# Patient Record
Sex: FEMALE | Race: OTHER | NOT HISPANIC OR LATINO | URBAN - METROPOLITAN AREA
[De-identification: names, ages, dates, MRNs, and addresses within clinical notes are randomized per-mention and may not be internally consistent; named-entity substitution may affect disease eponyms.]

---

## 2024-05-12 ENCOUNTER — INPATIENT (INPATIENT)
Facility: HOSPITAL | Age: 34
LOS: 1 days | Discharge: HOME | DRG: 951 | End: 2024-05-14
Attending: OBSTETRICS & GYNECOLOGY | Admitting: OBSTETRICS & GYNECOLOGY
Payer: MEDICAID

## 2024-05-12 VITALS
RESPIRATION RATE: 16 BRPM | WEIGHT: 149.91 LBS | SYSTOLIC BLOOD PRESSURE: 136 MMHG | TEMPERATURE: 99 F | DIASTOLIC BLOOD PRESSURE: 65 MMHG | OXYGEN SATURATION: 100 % | HEART RATE: 86 BPM | HEIGHT: 66 IN

## 2024-05-12 DIAGNOSIS — O26.899 OTHER SPECIFIED PREGNANCY RELATED CONDITIONS, UNSPECIFIED TRIMESTER: ICD-10-CM

## 2024-05-12 DIAGNOSIS — Z34.80 ENCOUNTER FOR SUPERVISION OF OTHER NORMAL PREGNANCY, UNSPECIFIED TRIMESTER: ICD-10-CM

## 2024-05-12 LAB
APTT BLD: 29.2 SEC — SIGNIFICANT CHANGE UP (ref 24.5–35.6)
BASOPHILS # BLD AUTO: 0.02 K/UL — SIGNIFICANT CHANGE UP (ref 0–0.2)
BASOPHILS NFR BLD AUTO: 0.1 % — SIGNIFICANT CHANGE UP (ref 0–2)
BLD GP AB SCN SERPL QL: SIGNIFICANT CHANGE UP
EOSINOPHIL # BLD AUTO: 0.02 K/UL — SIGNIFICANT CHANGE UP (ref 0–0.5)
EOSINOPHIL NFR BLD AUTO: 0.1 % — SIGNIFICANT CHANGE UP (ref 0–6)
FIBRINOGEN PPP-MCNC: 381 MG/DL — SIGNIFICANT CHANGE UP (ref 200–475)
HCT VFR BLD CALC: 37.9 % — SIGNIFICANT CHANGE UP (ref 34.5–45)
HGB BLD-MCNC: 12.2 G/DL — SIGNIFICANT CHANGE UP (ref 11.5–15.5)
IMM GRANULOCYTES NFR BLD AUTO: 0.3 % — SIGNIFICANT CHANGE UP (ref 0–0.9)
INR BLD: 0.88 RATIO — SIGNIFICANT CHANGE UP (ref 0.85–1.18)
LYMPHOCYTES # BLD AUTO: 1.08 K/UL — SIGNIFICANT CHANGE UP (ref 1–3.3)
LYMPHOCYTES # BLD AUTO: 7.6 % — LOW (ref 13–44)
MCHC RBC-ENTMCNC: 27.5 PG — SIGNIFICANT CHANGE UP (ref 27–34)
MCHC RBC-ENTMCNC: 32.2 GM/DL — SIGNIFICANT CHANGE UP (ref 32–36)
MCV RBC AUTO: 85.4 FL — SIGNIFICANT CHANGE UP (ref 80–100)
MONOCYTES # BLD AUTO: 0.68 K/UL — SIGNIFICANT CHANGE UP (ref 0–0.9)
MONOCYTES NFR BLD AUTO: 4.8 % — SIGNIFICANT CHANGE UP (ref 2–14)
NEUTROPHILS # BLD AUTO: 12.29 K/UL — HIGH (ref 1.8–7.4)
NEUTROPHILS NFR BLD AUTO: 87.1 % — HIGH (ref 43–77)
NRBC # BLD: 0 /100 WBCS — SIGNIFICANT CHANGE UP (ref 0–0)
PLATELET # BLD AUTO: 263 K/UL — SIGNIFICANT CHANGE UP (ref 150–400)
PROTHROM AB SERPL-ACNC: 10.1 SEC — SIGNIFICANT CHANGE UP (ref 9.5–13)
RBC # BLD: 4.44 M/UL — SIGNIFICANT CHANGE UP (ref 3.8–5.2)
RBC # FLD: 14.4 % — SIGNIFICANT CHANGE UP (ref 10.3–14.5)
WBC # BLD: 14.13 K/UL — HIGH (ref 3.8–10.5)
WBC # FLD AUTO: 14.13 K/UL — HIGH (ref 3.8–10.5)

## 2024-05-12 PROCEDURE — 88307 TISSUE EXAM BY PATHOLOGIST: CPT | Mod: 26

## 2024-05-12 RX ORDER — FAMOTIDINE 10 MG/ML
20 INJECTION INTRAVENOUS DAILY
Refills: 0 | Status: DISCONTINUED | OUTPATIENT
Start: 2024-05-12 | End: 2024-05-12

## 2024-05-12 RX ORDER — AMPICILLIN TRIHYDRATE 250 MG
1 CAPSULE ORAL EVERY 4 HOURS
Refills: 0 | Status: DISCONTINUED | OUTPATIENT
Start: 2024-05-12 | End: 2024-05-12

## 2024-05-12 RX ORDER — ACETAMINOPHEN 500 MG
975 TABLET ORAL EVERY 6 HOURS
Refills: 0 | Status: DISCONTINUED | OUTPATIENT
Start: 2024-05-12 | End: 2024-05-14

## 2024-05-12 RX ORDER — SODIUM CHLORIDE 9 MG/ML
3 INJECTION INTRAMUSCULAR; INTRAVENOUS; SUBCUTANEOUS EVERY 8 HOURS
Refills: 0 | Status: DISCONTINUED | OUTPATIENT
Start: 2024-05-12 | End: 2024-05-14

## 2024-05-12 RX ORDER — KETOROLAC TROMETHAMINE 30 MG/ML
30 SYRINGE (ML) INJECTION ONCE
Refills: 0 | Status: DISCONTINUED | OUTPATIENT
Start: 2024-05-12 | End: 2024-05-12

## 2024-05-12 RX ORDER — CITRIC ACID/SODIUM CITRATE 300-500 MG
15 SOLUTION, ORAL ORAL EVERY 6 HOURS
Refills: 0 | Status: DISCONTINUED | OUTPATIENT
Start: 2024-05-12 | End: 2024-05-12

## 2024-05-12 RX ORDER — OXYTOCIN 10 UNIT/ML
333.33 VIAL (ML) INJECTION
Qty: 20 | Refills: 0 | Status: DISCONTINUED | OUTPATIENT
Start: 2024-05-12 | End: 2024-05-14

## 2024-05-12 RX ORDER — PRAMOXINE HYDROCHLORIDE 150 MG/15G
1 AEROSOL, FOAM RECTAL EVERY 4 HOURS
Refills: 0 | Status: DISCONTINUED | OUTPATIENT
Start: 2024-05-12 | End: 2024-05-14

## 2024-05-12 RX ORDER — IBUPROFEN 200 MG
600 TABLET ORAL EVERY 6 HOURS
Refills: 0 | Status: COMPLETED | OUTPATIENT
Start: 2024-05-12 | End: 2025-04-10

## 2024-05-12 RX ORDER — CHLORHEXIDINE GLUCONATE 213 G/1000ML
1 SOLUTION TOPICAL DAILY
Refills: 0 | Status: DISCONTINUED | OUTPATIENT
Start: 2024-05-12 | End: 2024-05-12

## 2024-05-12 RX ORDER — AER TRAVELER 0.5 G/1
1 SOLUTION RECTAL; TOPICAL EVERY 4 HOURS
Refills: 0 | Status: DISCONTINUED | OUTPATIENT
Start: 2024-05-12 | End: 2024-05-14

## 2024-05-12 RX ORDER — DIPHENHYDRAMINE HCL 50 MG
25 CAPSULE ORAL EVERY 6 HOURS
Refills: 0 | Status: DISCONTINUED | OUTPATIENT
Start: 2024-05-12 | End: 2024-05-14

## 2024-05-12 RX ORDER — SODIUM CHLORIDE 9 MG/ML
1000 INJECTION, SOLUTION INTRAVENOUS
Refills: 0 | Status: DISCONTINUED | OUTPATIENT
Start: 2024-05-12 | End: 2024-05-12

## 2024-05-12 RX ORDER — DIBUCAINE 1 %
1 OINTMENT (GRAM) RECTAL EVERY 6 HOURS
Refills: 0 | Status: DISCONTINUED | OUTPATIENT
Start: 2024-05-12 | End: 2024-05-14

## 2024-05-12 RX ORDER — MAGNESIUM HYDROXIDE 400 MG/1
30 TABLET, CHEWABLE ORAL
Refills: 0 | Status: DISCONTINUED | OUTPATIENT
Start: 2024-05-12 | End: 2024-05-14

## 2024-05-12 RX ORDER — BENZOCAINE 10 %
1 GEL (GRAM) MUCOUS MEMBRANE EVERY 6 HOURS
Refills: 0 | Status: DISCONTINUED | OUTPATIENT
Start: 2024-05-12 | End: 2024-05-14

## 2024-05-12 RX ORDER — SIMETHICONE 80 MG/1
80 TABLET, CHEWABLE ORAL EVERY 4 HOURS
Refills: 0 | Status: DISCONTINUED | OUTPATIENT
Start: 2024-05-12 | End: 2024-05-14

## 2024-05-12 RX ORDER — TETANUS TOXOID, REDUCED DIPHTHERIA TOXOID AND ACELLULAR PERTUSSIS VACCINE, ADSORBED 5; 2.5; 8; 8; 2.5 [IU]/.5ML; [IU]/.5ML; UG/.5ML; UG/.5ML; UG/.5ML
0.5 SUSPENSION INTRAMUSCULAR ONCE
Refills: 0 | Status: DISCONTINUED | OUTPATIENT
Start: 2024-05-12 | End: 2024-05-14

## 2024-05-12 RX ORDER — AMPICILLIN TRIHYDRATE 250 MG
2 CAPSULE ORAL ONCE
Refills: 0 | Status: COMPLETED | OUTPATIENT
Start: 2024-05-12 | End: 2024-05-12

## 2024-05-12 RX ORDER — OXYCODONE HYDROCHLORIDE 5 MG/1
5 TABLET ORAL ONCE
Refills: 0 | Status: DISCONTINUED | OUTPATIENT
Start: 2024-05-12 | End: 2024-05-14

## 2024-05-12 RX ORDER — LANOLIN
1 OINTMENT (GRAM) TOPICAL EVERY 6 HOURS
Refills: 0 | Status: DISCONTINUED | OUTPATIENT
Start: 2024-05-12 | End: 2024-05-14

## 2024-05-12 RX ORDER — HYDROCORTISONE 1 %
1 OINTMENT (GRAM) TOPICAL EVERY 6 HOURS
Refills: 0 | Status: DISCONTINUED | OUTPATIENT
Start: 2024-05-12 | End: 2024-05-14

## 2024-05-12 RX ADMIN — SODIUM CHLORIDE 125 MILLILITER(S): 9 INJECTION, SOLUTION INTRAVENOUS at 13:29

## 2024-05-12 RX ADMIN — Medication 1 APPLICATION(S): at 15:52

## 2024-05-12 RX ADMIN — Medication 1 TABLET(S): at 14:42

## 2024-05-12 RX ADMIN — Medication 975 MILLIGRAM(S): at 13:30

## 2024-05-12 RX ADMIN — Medication 975 MILLIGRAM(S): at 14:43

## 2024-05-12 RX ADMIN — SODIUM CHLORIDE 3 MILLILITER(S): 9 INJECTION INTRAMUSCULAR; INTRAVENOUS; SUBCUTANEOUS at 13:30

## 2024-05-12 RX ADMIN — Medication 1000 MILLIUNIT(S)/MIN: at 13:23

## 2024-05-12 RX ADMIN — Medication 30 MILLIGRAM(S): at 13:30

## 2024-05-12 RX ADMIN — Medication 30 MILLIGRAM(S): at 12:32

## 2024-05-12 RX ADMIN — Medication 1 SPRAY(S): at 14:42

## 2024-05-12 RX ADMIN — Medication 975 MILLIGRAM(S): at 23:04

## 2024-05-12 RX ADMIN — SODIUM CHLORIDE 3 MILLILITER(S): 9 INJECTION INTRAMUSCULAR; INTRAVENOUS; SUBCUTANEOUS at 22:36

## 2024-05-12 RX ADMIN — Medication 200 GRAM(S): at 10:05

## 2024-05-12 RX ADMIN — Medication 1 APPLICATION(S): at 15:51

## 2024-05-12 RX ADMIN — PRAMOXINE HYDROCHLORIDE 1 APPLICATION(S): 150 AEROSOL, FOAM RECTAL at 15:51

## 2024-05-12 RX ADMIN — Medication 975 MILLIGRAM(S): at 22:04

## 2024-05-12 NOTE — OB PROVIDER H&P - NSHPPHYSICALEXAM_GEN_ALL_CORE
Gen: Pt appears well, nad  Resp: satting well on RA  Abd: Gravid, NT, not guarding  SVE: 5/100/0/int  Ext: no edema Gen: Pt appears well, nad  Resp: satting well on RA  Abd: Gravid, NT, not guarding  SVE: 5/100/0/int  Ext: no edema  limited BSUS: vtx

## 2024-05-12 NOTE — OB PROVIDER H&P - NSLOWPPHRISK_OBGYN_A_OB
No previous uterine incision/[place] Pregnancy/Less than or equal to 4 previous vaginal births/No known bleeding disorder/No history of postpartum hemorrhage/No other PPH risks indicated

## 2024-05-12 NOTE — OB PROVIDER H&P - HISTORY OF PRESENT ILLNESS
33 yo  @ 38wks LMP 8/15/23 presenting with contractions since 2:30am. Patient's pain is 8/10 which prompted her to come in. Pt states she also noted vaginal bleeding. Pt appreciates fm, denies lof.    [facility]- Dr. Saucedo no comp    ObHx:    2023 complete  GynHx: denies h/o abnl pap smear, fibroids, cysts, or stds  Pmhx: denies  Pshx: denies  Allergies: nkda  Meds: denies  Soc Hx: denies etoh/tobacco/drug use  Psych Hx: denies anxiety, depression, trauma or abuse

## 2024-05-12 NOTE — OB RN PATIENT PROFILE - FUNCTIONAL ASSESSMENT - BASIC MOBILITY 1.
[Normal Growth] : growth [Normal Development] : development [None] : No known medical problems [No Elimination Concerns] : elimination [No Feeding Concerns] : feeding [No Skin Concerns] : skin [Normal Sleep Pattern] : sleep [Family Routines] : family routines [Language Promotion and Communication] : language promotion and communication [Social Development] : social development [ Considerations] :  considerations [Safety] : safety [No Medications] : ~He/She~ is not on any medications [Parent/Guardian] : parent/guardian [FreeTextEntry1] : SWYC wnl, VX up to date, physical exam unremarkable, G&D wnl, f/u 6 months growth failure and expressive speech delay 4 = No assist / stand by assistance

## 2024-05-12 NOTE — OB PROVIDER LABOR PROGRESS NOTE - ASSESSMENT
A/P 33yo  @ 38wks presenting in early labor.  Patient is stable.  - cont close maternal and fetal monitoring  - pain management per patient request  - cont ampicillin for GBS  - expectant mgmt  d/w Dr. Judy arce attending

## 2024-05-12 NOTE — OB PROVIDER LABOR PROGRESS NOTE - NS_SUBJECTIVE/OBJECTIVE_OBGYN_ALL_OB_FT
Patient seen and examined at bedside, offers no new complaints. Patient does not desire pain management at this time.    vss  Gen: Pt appears well, nad  Abd: Gravid, Nontender, not guarding  Ext: No edema  SVE: 9/100/0 AROM moderate mec

## 2024-05-12 NOTE — OB RN DELIVERY SUMMARY - AMNIOTIC FLUID COLOR, LABOR
PRE-OP DIAGNOSIS:  Fetal demise before 20 weeks with retention of dead fetus 14-Jan-2022 14:59:53  Manasa Bernal  
heavy meconium

## 2024-05-12 NOTE — OB PROVIDER H&P - ASSESSMENT
A/P 35 yo  @ 38wks presenting in early labor. Patient is stable  - admit to labor and delivery  - cont close maternal and fetal monitoring  - pain management per patient request- patient does not desire epidural at this time  - admission labs  - f/up prenatals  - for AROM  - Dr. Saucedo made aware  - d/w Dr. Rimarachin house attending

## 2024-05-12 NOTE — OB PROVIDER LABOR PROGRESS NOTE - NS_OBIHIFHRDETAILS_OBGYN_ALL_OB_FT
Baseline: 140 bpm  Variability: Moderate variability  Accelerations: Present  Decelerations: Variables    Cat II tracing, reactive

## 2024-05-13 LAB
BASOPHILS # BLD AUTO: 0.05 K/UL — SIGNIFICANT CHANGE UP (ref 0–0.2)
BASOPHILS NFR BLD AUTO: 0.3 % — SIGNIFICANT CHANGE UP (ref 0–2)
EOSINOPHIL # BLD AUTO: 0.03 K/UL — SIGNIFICANT CHANGE UP (ref 0–0.5)
EOSINOPHIL NFR BLD AUTO: 0.2 % — SIGNIFICANT CHANGE UP (ref 0–6)
HCT VFR BLD CALC: 33.3 % — LOW (ref 34.5–45)
HGB BLD-MCNC: 10.8 G/DL — LOW (ref 11.5–15.5)
IMM GRANULOCYTES NFR BLD AUTO: 0.5 % — SIGNIFICANT CHANGE UP (ref 0–0.9)
LYMPHOCYTES # BLD AUTO: 1.27 K/UL — SIGNIFICANT CHANGE UP (ref 1–3.3)
LYMPHOCYTES # BLD AUTO: 7.1 % — LOW (ref 13–44)
MCHC RBC-ENTMCNC: 28.2 PG — SIGNIFICANT CHANGE UP (ref 27–34)
MCHC RBC-ENTMCNC: 32.4 GM/DL — SIGNIFICANT CHANGE UP (ref 32–36)
MCV RBC AUTO: 86.9 FL — SIGNIFICANT CHANGE UP (ref 80–100)
MONOCYTES # BLD AUTO: 1.16 K/UL — HIGH (ref 0–0.9)
MONOCYTES NFR BLD AUTO: 6.5 % — SIGNIFICANT CHANGE UP (ref 2–14)
NEUTROPHILS # BLD AUTO: 15.37 K/UL — HIGH (ref 1.8–7.4)
NEUTROPHILS NFR BLD AUTO: 85.4 % — HIGH (ref 43–77)
NRBC # BLD: 0 /100 WBCS — SIGNIFICANT CHANGE UP (ref 0–0)
PLATELET # BLD AUTO: 240 K/UL — SIGNIFICANT CHANGE UP (ref 150–400)
RBC # BLD: 3.83 M/UL — SIGNIFICANT CHANGE UP (ref 3.8–5.2)
RBC # FLD: 14.7 % — HIGH (ref 10.3–14.5)
T PALLIDUM AB TITR SER: NEGATIVE — SIGNIFICANT CHANGE UP
WBC # BLD: 17.97 K/UL — HIGH (ref 3.8–10.5)
WBC # FLD AUTO: 17.97 K/UL — HIGH (ref 3.8–10.5)

## 2024-05-13 RX ORDER — IBUPROFEN 200 MG
600 TABLET ORAL EVERY 6 HOURS
Refills: 0 | Status: DISCONTINUED | OUTPATIENT
Start: 2024-05-13 | End: 2024-05-14

## 2024-05-13 RX ADMIN — Medication 975 MILLIGRAM(S): at 07:11

## 2024-05-13 RX ADMIN — Medication 1 TABLET(S): at 11:49

## 2024-05-13 RX ADMIN — Medication 975 MILLIGRAM(S): at 11:49

## 2024-05-13 RX ADMIN — MAGNESIUM HYDROXIDE 30 MILLILITER(S): 400 TABLET, CHEWABLE ORAL at 08:58

## 2024-05-13 RX ADMIN — SODIUM CHLORIDE 3 MILLILITER(S): 9 INJECTION INTRAMUSCULAR; INTRAVENOUS; SUBCUTANEOUS at 22:45

## 2024-05-13 RX ADMIN — Medication 600 MILLIGRAM(S): at 22:45

## 2024-05-13 RX ADMIN — Medication 975 MILLIGRAM(S): at 06:11

## 2024-05-13 RX ADMIN — Medication 600 MILLIGRAM(S): at 21:33

## 2024-05-13 RX ADMIN — Medication 975 MILLIGRAM(S): at 17:29

## 2024-05-13 RX ADMIN — SODIUM CHLORIDE 3 MILLILITER(S): 9 INJECTION INTRAMUSCULAR; INTRAVENOUS; SUBCUTANEOUS at 06:48

## 2024-05-13 RX ADMIN — Medication 975 MILLIGRAM(S): at 12:49

## 2024-05-13 RX ADMIN — Medication 975 MILLIGRAM(S): at 18:29

## 2024-05-13 NOTE — OB PROVIDER DELIVERY SUMMARY - NSPROVIDERDELIVERYNOTE_OBGYN_ALL_OB_FT
baby boy in cephalic presentation, Apgar 9/9, cord around the neck X1, tights, Blood lost 250 cc Rt. episiotomy , repair w/ 2-0 chromic

## 2024-05-14 VITALS
RESPIRATION RATE: 18 BRPM | HEART RATE: 88 BPM | TEMPERATURE: 98 F | DIASTOLIC BLOOD PRESSURE: 64 MMHG | SYSTOLIC BLOOD PRESSURE: 109 MMHG | OXYGEN SATURATION: 99 %

## 2024-05-14 PROCEDURE — 36415 COLL VENOUS BLD VENIPUNCTURE: CPT

## 2024-05-14 PROCEDURE — 85025 COMPLETE CBC W/AUTO DIFF WBC: CPT

## 2024-05-14 PROCEDURE — 85610 PROTHROMBIN TIME: CPT

## 2024-05-14 PROCEDURE — 85384 FIBRINOGEN ACTIVITY: CPT

## 2024-05-14 PROCEDURE — 86900 BLOOD TYPING SEROLOGIC ABO: CPT

## 2024-05-14 PROCEDURE — 86850 RBC ANTIBODY SCREEN: CPT

## 2024-05-14 PROCEDURE — 86780 TREPONEMA PALLIDUM: CPT

## 2024-05-14 PROCEDURE — 85730 THROMBOPLASTIN TIME PARTIAL: CPT

## 2024-05-14 PROCEDURE — 88307 TISSUE EXAM BY PATHOLOGIST: CPT

## 2024-05-14 PROCEDURE — 86901 BLOOD TYPING SEROLOGIC RH(D): CPT

## 2024-05-14 PROCEDURE — 59050 FETAL MONITOR W/REPORT: CPT

## 2024-05-14 RX ORDER — DOCUSATE SODIUM 100 MG
1 CAPSULE ORAL
Qty: 30 | Refills: 0
Start: 2024-05-14 | End: 2024-06-12

## 2024-05-14 RX ORDER — ACETAMINOPHEN 500 MG
3 TABLET ORAL
Qty: 360 | Refills: 0
Start: 2024-05-14 | End: 2024-06-12

## 2024-05-14 RX ORDER — SIMETHICONE 80 MG/1
1 TABLET, CHEWABLE ORAL
Qty: 30 | Refills: 0
Start: 2024-05-14 | End: 2024-06-12

## 2024-05-14 RX ORDER — IBUPROFEN 200 MG
1 TABLET ORAL
Qty: 120 | Refills: 0
Start: 2024-05-14 | End: 2024-06-12

## 2024-05-14 RX ADMIN — SODIUM CHLORIDE 3 MILLILITER(S): 9 INJECTION INTRAMUSCULAR; INTRAVENOUS; SUBCUTANEOUS at 06:13

## 2024-05-14 RX ADMIN — Medication 600 MILLIGRAM(S): at 13:43

## 2024-05-14 RX ADMIN — Medication 1 TABLET(S): at 12:43

## 2024-05-14 RX ADMIN — Medication 975 MILLIGRAM(S): at 09:42

## 2024-05-14 RX ADMIN — Medication 975 MILLIGRAM(S): at 10:42

## 2024-05-14 RX ADMIN — Medication 600 MILLIGRAM(S): at 12:43

## 2024-05-14 NOTE — LACTATION INITIAL EVALUATION - LACTATION INTERVENTIONS
support person at bedside, serves as  as needed in Romanian per pt's preference; Breastfeeding on cue 8-12X/24 hours with diaper count to assess for adequate intake, safe skin to skin and rooming-in encouraged./initiate/review safe skin-to-skin/initiate/review hand expression/reviewed components of an effective feeding and at least 8 effective feedings per day required/reviewed importance of monitoring infant diapers, the breastfeeding log, and minimum output each day/reviewed benefits and recommendations for rooming in/reviewed feeding on demand/by cue at least 8 times a day
initiate/review hand expression/review techniques to manage sore nipples/engorgement/reviewed importance of monitoring infant diapers, the breastfeeding log, and minimum output each day/reviewed benefits and recommendations for rooming in/reviewed feeding on demand/by cue at least 8 times a day

## 2024-05-14 NOTE — DISCHARGE NOTE OB - PATIENT PORTAL LINK FT
You can access the FollowMyHealth Patient Portal offered by [facility] by registering at the following website: [email or web address]. By joining [employer]’s FollowMyHealth portal, you will also be able to view your health information using other applications (apps) compatible with our system.

## 2024-05-14 NOTE — PROGRESS NOTE ADULT - PROBLEM SELECTOR PLAN 1
- Discharge home with instructions  - Follow up in office in 5-6 weeks for postpartum visit  - Breastfeeding encouraged   - Rx'ed ibuprofen, acetaminophen, colace and simethicone to pharmacy  -d/w dr Saucedo

## 2024-05-14 NOTE — DISCHARGE NOTE OB - CARE PROVIDER_API CALL
Gordo Saucedo  Obstetrics and Gynecology  [address], [address]  [place] [address]  Phone: [phone]  Fax: [phone]  Follow Up Time: 1 month

## 2024-05-14 NOTE — PROGRESS NOTE ADULT - SUBJECTIVE AND OBJECTIVE BOX
PA NOTE:  PPD#1  s/p:  at 38weeks    doing well voiding    Patient seen at bedside resting comfortably offers no current complaints. Ambulating and voiding without difficulty.  Passing flatus and tolerating regular diet.  both breast/bottle feeding . Denies HA, CP, SOB, N/V/D, dizziness, palpitations, worsening abdominal pain, worsening vaginal bleeding, or any other concerns.      Vital Signs Last 24 Hrs  T(C): 36.2 (13 May 2024 05:55), Max: 36.7 (12 May 2024 14:20)  T(F): 97.2 (13 May 2024 05:55), Max: 98 (12 May 2024 14:20)  HR: 97 (13 May 2024 05:55) (74 - 97)  BP: 122/71 (13 May 2024 05:55) (103/66 - 122/71)  BP(mean): 74 (12 May 2024 13:04) (73 - 81)  RR: 18 (13 May 2024 05:55) (16 - 18)  SpO2: 98% (13 May 2024 05:55) (98% - 100%)    Parameters below as of 13 May 2024 05:55  Patient On (Oxygen Delivery Method): room air        Physical Exam:     Gen: A&Ox 3, NAD  Chest: CTA B/L  Cardiac: S1+S2; RRR  Breast: Soft, nontender, nonengorged  Abdomen: +Bs; Soft, nontender,  ND; Fundus firm below umbilicus  Gyn: mod lochia  perineum: RML repaired after delivery   Ext: Nontender, DTRS 2+, no worsening edema    
OBGYN PA Note  Patient seen at bedside resting comfortably offers no current complaints.  Ambulating and voiding without difficulty.  Passing flatus and tolerating regular diet. No BM; Both breast/bottle feeding.  Denies HA, CP, SOB, N/V/D, dizziness, palpitations, worsening abdominal pain, worsening vaginal bleeding, or any other concerns.      Vital Signs Last 24 Hrs  T(C): 36.4 (14 May 2024 04:59), Max: 36.7 (13 May 2024 18:22)  T(F): 97.5 (14 May 2024 04:59), Max: 98 (13 May 2024 18:22)  HR: 88 (14 May 2024 04:59) (80 - 88)  BP: 109/64 (14 May 2024 04:59) (109/64 - 143/82)  BP(mean): --  RR: 18 (14 May 2024 04:59) (18 - 18)  SpO2: 99% (14 May 2024 04:59) (98% - 99%)    Parameters below as of 14 May 2024 04:59  Patient On (Oxygen Delivery Method): room air    Physical Exam:     Gen: A&Ox 3, NAD  Chest: CTA B/L  Cardiac: S1,S2; RRR  Breast: Soft, nontender, nonengorged  Abdomen:  Soft, nontender, ND; Fundus firm below umbilicus  Gyn: Min lochia  Ext: Nontender, DTRS 2+, no worsening edema                          10.8   17.97 )-----------( 240      ( 13 May 2024 07:00 )             33.3

## 2024-05-14 NOTE — DISCHARGE NOTE OB - MEDICATION SUMMARY - MEDICATIONS TO TAKE
I will START or STAY ON the medications listed below when I get home from the hospital:    ibuprofen 600 mg oral tablet  -- 1 tab(s) by mouth every 6 hours  -- Indication: For for moderate pain    acetaminophen 325 mg oral tablet  -- 3 tab(s) by mouth every 6 hours  -- Indication: For for mild pain    Colace 100 mg oral capsule  -- 1 cap(s) by mouth once a day  -- Indication: For for constipation    simethicone 80 mg oral tablet, chewable  -- 1 tab(s) chewed once a day  -- Indication: For for gas pain

## 2024-05-14 NOTE — DISCHARGE NOTE OB - NS MD DC FALL RISK RISK
For information on Fall & Injury Prevention, visit: [email or web address] OR  [email or web address] OR  https://www.cdc.gov/steadi/patient.html

## 2024-05-14 NOTE — PROGRESS NOTE ADULT - ASSESSMENT
PA NOTE:  PPD#1  s/p:  at 38weeks    doing well voiding                          10.8   17.97 )-----------( 240      ( 13 May 2024 07:00 )             33.3   -Continue pain management  -Encourage OOB and ambulation  -encourage breastfeeding   -Plan for discharge tomorrow  -d/w dr kang 
A/P: 33 yo PPD#2 s/p . Patient is stable.     - Discharge home with instructions  - Follow up in office in 5-6 weeks for postpartum visit  - Breastfeeding encouraged   - Rx'ed ibuprofen, acetaminophen, colace and simethicone to pharmacy  -d/w dr Saucedo

## 2024-05-14 NOTE — DISCHARGE NOTE OB - HOSPITAL COURSE
Patient admitted to labor and delivery in early labor. Patient had non- complicated vaginal delivery. Patient had routine postpartum care and pain management. Once stable and meeting all milestones patient discharged to home.

## 2024-06-19 LAB — SURGICAL PATHOLOGY STUDY: SIGNIFICANT CHANGE UP
